# Patient Record
Sex: MALE | Race: WHITE | URBAN - METROPOLITAN AREA
[De-identification: names, ages, dates, MRNs, and addresses within clinical notes are randomized per-mention and may not be internally consistent; named-entity substitution may affect disease eponyms.]

---

## 2018-06-11 ENCOUNTER — OFFICE VISIT (OUTPATIENT)
Dept: ENDOCRINOLOGY | Age: 63
End: 2018-06-11

## 2018-06-11 VITALS
TEMPERATURE: 96.4 F | HEART RATE: 57 BPM | SYSTOLIC BLOOD PRESSURE: 147 MMHG | DIASTOLIC BLOOD PRESSURE: 80 MMHG | OXYGEN SATURATION: 98 % | BODY MASS INDEX: 29.16 KG/M2 | RESPIRATION RATE: 16 BRPM | HEIGHT: 73 IN | WEIGHT: 220 LBS

## 2018-06-11 DIAGNOSIS — R73.03 PREDIABETES: ICD-10-CM

## 2018-06-11 DIAGNOSIS — E05.90 SUBCLINICAL HYPERTHYROIDISM: Primary | ICD-10-CM

## 2018-06-11 RX ORDER — EZETIMIBE 10 MG/1
TABLET ORAL
COMMUNITY

## 2018-06-11 RX ORDER — VERAPAMIL HYDROCHLORIDE 300 MG/1
CAPSULE, EXTENDED RELEASE ORAL
COMMUNITY

## 2018-06-11 RX ORDER — CHOLECALCIFEROL TAB 125 MCG (5000 UNIT) 125 MCG
TAB ORAL DAILY
COMMUNITY

## 2018-06-11 RX ORDER — MONTELUKAST SODIUM 10 MG/1
10 TABLET ORAL DAILY
COMMUNITY

## 2018-06-11 RX ORDER — BUDESONIDE AND FORMOTEROL FUMARATE DIHYDRATE 160; 4.5 UG/1; UG/1
2 AEROSOL RESPIRATORY (INHALATION) 2 TIMES DAILY
COMMUNITY

## 2018-06-11 RX ORDER — CHOLECALCIFEROL (VITAMIN D3) 125 MCG
CAPSULE ORAL
COMMUNITY

## 2018-06-11 RX ORDER — LORATADINE 10 MG/1
10 TABLET ORAL
COMMUNITY

## 2018-06-11 NOTE — MR AVS SNAPSHOT
[address] [facility] [address] 
[phone] Patient: Dagoberto Jimenez MRN: [id number] Visit Information Date & Time Provider Department Dept. Phone Encounter #  
 6/11/2018  3:00 PM Law Sanchez MD [facility] [phone] [id number] Follow-up Instructions Return if symptoms worsen or fail to improve. Upcoming Health Maintenance Date Due Hepatitis C Screening 1955 DTaP/Tdap/Td series (1 - Tdap) 11/5/1976 FOBT Q 1 YEAR AGE 50-75 11/5/2005 ZOSTER VACCINE AGE 60> 9/5/2015 Influenza Age 5 to Adult 8/1/2018 Allergies as of 6/11/2018  Review Complete On: 6/11/2018 By: Law Sanchez MD  
 No Known Allergies Current Immunizations  Never Reviewed No immunizations on file. Not reviewed this visit You Were Diagnosed With   
  
 Codes Comments Subclinical hyperthyroidism    -  Primary ICD-10-CM: E05.90 ICD-9-CM: 242.90 Vitals BP Pulse Temp Resp Height(growth percentile) Weight(growth percentile) 147/80 (BP 1 Location: Left arm, BP Patient Position: Sitting) (!) 57 96.4 °F (35.8 °C) (Oral) 16 6' 1\" (1.854 m) 220 lb (99.8 kg) SpO2 BMI Smoking Status 98% 29.03 kg/m2 Former Smoker Vitals History BMI and BSA Data Body Mass Index Body Surface Area  
 29.03 kg/m 2 2.27 m 2 Your Updated Medication List  
  
   
This list is accurate as of 6/11/18  3:59 PM.  Always use your most recent med list.  
  
  
  
  
 ezetimibe 10 mg tablet Commonly known as:  Villa Jimenez Take  by mouth. FISH OIL PO Take 1,200 mg by mouth.  
  
 loratadine 10 mg tablet Commonly known as:  Michael Jackson Take 10 mg by mouth.  
  
 montelukast 10 mg tablet Commonly known as:  SINGULAIR Take 10 mg by mouth daily. SYMBICORT 160-4.5 mcg/actuation Hfaa Generic drug:  budesonide-formoterol Take 2 Puffs by inhalation two (2) times a day. verapamil  mg capsule Commonly known as:  VERELAN PM  
Take  by mouth nightly. * VITAMIN D3 2,000 unit Tab Generic drug:  cholecalciferol (vitamin D3) Take  by mouth. * cholecalciferol (VITAMIN D3) 5,000 unit Tab tablet Commonly known as:  VITAMIN D3 Take  by mouth daily. * Notice: This list has 2 medication(s) that are the same as other medications prescribed for you. Read the directions carefully, and ask your doctor or other care provider to review them with you. Follow-up Instructions Return if symptoms worsen or fail to improve. Introducing [facility]! Nancy Garcia introduces [employer] patient portal. Now you can access parts of your medical record, email your doctor's office, and request medication refills online. 1. In your internet browser, go to [email or web address]. [email or web address] 2. Click on the First Time User? Click Here link in the Sign In box. You will see the New Member Sign Up page. 3. Enter your [employer] Access Code exactly as it appears below. You will not need to use this code after youve completed the sign-up process. If you do not sign up before the expiration date, you must request a new code. · [employer] Access Code: [email or web address] Expires: 9/9/2018  3:59 PM 
 
4. Enter the last four digits of your Social Security Number (xxxx) and Date of Birth (mm/dd/yyyy) as indicated and click Submit. You will be taken to the next sign-up page. 5. Create a [employer] ID. This will be your [employer] login ID and cannot be changed, so think of one that is secure and easy to remember. 6. Create a [employer] password. You can change your password at any time. 7. Enter your Password Reset Question and Answer. This can be used at a later time if you forget your password. 8. Enter your e-mail address. You will receive e-mail notification when new information is available in [address] 9. Click Sign Up. You can now view and download portions of your medical record. 10. Click the Download Summary menu link to download a portable copy of your medical information. If you have questions, please visit the Frequently Asked Questions section of the [employer] website. Remember, [employer] is NOT to be used for urgent needs. For medical emergencies, dial 911. Now available from your iPhone and Android! Please provide this summary of care documentation to your next provider. Your primary care clinician is listed as Marquez Juares. If you have any questions after today's visit, please call [phone].

## 2018-06-11 NOTE — PROGRESS NOTES
Jordi Woodruff is a 58 y.o. male here for   Chief Complaint   Patient presents with    New Patient     Thyroid       1. Have you been to the ER, urgent care clinic since your last visit? Hospitalized since your last visit? - no    2. Have you seen or consulted any other health care providers outside of the [address] [place] [address] since your last visit?   Include any pap smears or colon screening.- PCP

## 2018-06-11 NOTE — PROGRESS NOTES
[facility]               Eloy Simmons MD              [address] [id number]           Patient Information  Date:6/11/2018  Name : Stephanie Bustos 58 y.o.     YOB: 1955         Referred by: Kelly Borjas MD         Chief Complaint   Patient presents with    New Patient     Thyroid       History of present illness    Stephanie Bustos is a 58 y.o. male      he is here for evaluation and management of abnormal thyroid function tests. he was found to have low TSH with normal free T4. No prior history of thyroid disease. TSH was low even in 2017. Mother has thyroid problem ,is  a patient of mine. Wife is with the patient, he was also told to have borderline diabetes which they want to discuss. He is retired recently retired, has been active. Diet could be better  He has no nervousness, weight loss, tremors, diarrhea  No iodine exposure, steroid use, biotin use , not on any thyroid supplements. No change in the size of the neck or neck pain. No dysphagia,dysphonia or dyspnea. No A fib or osteoporosis        Past Medical History:   Diagnosis Date    Asthma     Hyperlipidemia     Hypertension     GRETA (obstructive sleep apnea)     Prostate cancer (HCC)        Current Outpatient Prescriptions   Medication Sig    montelukast (SINGULAIR) 10 mg tablet Take 10 mg by mouth daily.  ezetimibe (ZETIA) 10 mg tablet Take  by mouth.  loratadine (CLARITIN) 10 mg tablet Take 10 mg by mouth.  verapamil ER (VERELAN PM) 300 mg capsule Take  by mouth nightly.  docosahexanoic acid/epa (FISH OIL PO) Take 1,200 mg by mouth.  cholecalciferol, vitamin D3, (VITAMIN D3) 2,000 unit tab Take  by mouth.  cholecalciferol, VITAMIN D3, (VITAMIN D3) 5,000 unit tab tablet Take  by mouth daily.  budesonide-formoterol (SYMBICORT) 160-4.5 mcg/actuation HFAA Take 2 Puffs by inhalation two (2) times a day.      No current facility-administered medications for this visit. Review of Systems:  All  systems reviewed and are negative other than mentioned in HPI      Physical Examination:  Blood pressure 147/80, pulse (!) 57, temperature 96.4 °F (35.8 °C), temperature source Oral, resp. rate 16, height 6' 1\" (1.854 m), weight 220 lb (99.8 kg), SpO2 98 %. - Body mass index is 29.03 kg/(m^2). - General: pleasant, no distress, good eye contact  - HEENT: no exopthalmos, no periorbital edema, no scleral/conjunctival injection, EOMI, no lid lag or stare  - Neck: supple, no thyromegaly, nodules, lymph nodes,   - Cardiovascular: regular,  normal S1 and S2, no murmurs  - Respiratory: clear to auscultation bilaterally  - Gastrointestinal: soft, nontender, nondistended, BS +  - Musculoskeletal: no proximal muscle weakness in upper or lower extremities  - Integumentary: no tremors, no edema  - Neurological: alert and oriented   - Psychiatric: normal mood and affect  - Skin - normal turgor    Data Reviewed:       [] Reviewed labs    Assessment/Plan:     1. Subclinical hyperthyroidism        He is asymptomatic. His TSH was low last year, so it is chronic. No known history of atrial fibrillation, CVD, osteoporosis. Clinically has no goiter so will not proceed with any nuclear medicine thyroid uptake and scan. No indication to treat subclinical hyperthyroidism unless TSH is less than 0.01, history of atrial fibrillation or has osteoporosis. Annual TSH, free T4, return if it gets worse      Prediabetes: Weight loss with good healthy diet and routine, almost daily exercise may help delay or reverse this trend. Avoid sweets, limit starches . Follow-up Disposition:  Return if symptoms worsen or fail to improve. Thank you for allowing me to participate in the care of this patient.     Eloy Simmons MD      Patient Leyla Stewart verbalized understanding    Voice-recognition software was used to generate this report, which may result in some phonetic-based errors in the grammar and contents. Even though attempts were made to correct all the mistakes, some may have been missed and remained in the body of the report.

## 2022-03-18 PROBLEM — E05.90 SUBCLINICAL HYPERTHYROIDISM: Status: ACTIVE | Noted: 2018-06-11

## 2022-03-20 PROBLEM — R73.03 PREDIABETES: Status: ACTIVE | Noted: 2018-06-11

## 2023-05-22 RX ORDER — EZETIMIBE 10 MG/1
TABLET ORAL
COMMUNITY

## 2023-05-22 RX ORDER — MONTELUKAST SODIUM 10 MG/1
10 TABLET ORAL DAILY
COMMUNITY

## 2023-05-22 RX ORDER — VERAPAMIL HYDROCHLORIDE 300 MG/1
CAPSULE, EXTENDED RELEASE ORAL
COMMUNITY

## 2023-05-22 RX ORDER — LORATADINE 10 MG/1
10 TABLET ORAL
COMMUNITY

## 2023-05-22 RX ORDER — BUDESONIDE AND FORMOTEROL FUMARATE DIHYDRATE 160; 4.5 UG/1; UG/1
2 AEROSOL RESPIRATORY (INHALATION) 2 TIMES DAILY
COMMUNITY